# Patient Record
Sex: MALE | Race: WHITE | NOT HISPANIC OR LATINO | Employment: OTHER | URBAN - METROPOLITAN AREA
[De-identification: names, ages, dates, MRNs, and addresses within clinical notes are randomized per-mention and may not be internally consistent; named-entity substitution may affect disease eponyms.]

---

## 2024-02-14 ENCOUNTER — TRANSFERRED RECORDS (OUTPATIENT)
Dept: HEALTH INFORMATION MANAGEMENT | Facility: CLINIC | Age: 64
End: 2024-02-14

## 2024-06-20 ENCOUNTER — TRANSCRIBE ORDERS (OUTPATIENT)
Dept: OTHER | Age: 64
End: 2024-06-20

## 2024-06-20 DIAGNOSIS — Z95.1 S/P CABG (CORONARY ARTERY BYPASS GRAFT): Primary | ICD-10-CM

## 2024-06-24 ENCOUNTER — TRANSCRIBE ORDERS (OUTPATIENT)
Dept: OTHER | Age: 64
End: 2024-06-24

## 2024-07-12 ENCOUNTER — HOSPITAL ENCOUNTER (OUTPATIENT)
Dept: CARDIAC REHAB | Facility: HOSPITAL | Age: 64
Discharge: HOME | End: 2024-07-12
Attending: NURSE PRACTITIONER
Payer: COMMERCIAL

## 2024-07-12 DIAGNOSIS — Z95.1 S/P CABG (CORONARY ARTERY BYPASS GRAFT): ICD-10-CM

## 2024-07-12 PROCEDURE — 93798 PHYS/QHP OP CAR RHAB W/ECG: CPT

## 2024-07-12 PROCEDURE — 93797 PHYS/QHP OP CAR RHAB WO ECG: CPT

## 2024-07-17 ENCOUNTER — HOSPITAL ENCOUNTER (OUTPATIENT)
Dept: CARDIAC REHAB | Facility: HOSPITAL | Age: 64
Discharge: HOME | End: 2024-07-17
Attending: NURSE PRACTITIONER
Payer: COMMERCIAL

## 2024-07-17 PROCEDURE — 93798 PHYS/QHP OP CAR RHAB W/ECG: CPT

## 2024-07-22 ENCOUNTER — HOSPITAL ENCOUNTER (OUTPATIENT)
Dept: CARDIAC REHAB | Facility: HOSPITAL | Age: 64
Discharge: HOME | End: 2024-07-22
Attending: NURSE PRACTITIONER
Payer: COMMERCIAL

## 2024-07-22 PROCEDURE — 93798 PHYS/QHP OP CAR RHAB W/ECG: CPT

## 2024-07-24 ENCOUNTER — HOSPITAL ENCOUNTER (OUTPATIENT)
Dept: CARDIAC REHAB | Facility: HOSPITAL | Age: 64
Discharge: HOME | End: 2024-07-24
Attending: NURSE PRACTITIONER
Payer: COMMERCIAL

## 2024-07-24 PROCEDURE — 93798 PHYS/QHP OP CAR RHAB W/ECG: CPT

## 2024-07-29 ENCOUNTER — HOSPITAL ENCOUNTER (OUTPATIENT)
Dept: CARDIAC REHAB | Facility: HOSPITAL | Age: 64
Discharge: HOME | End: 2024-07-29
Attending: NURSE PRACTITIONER
Payer: COMMERCIAL

## 2024-07-29 PROCEDURE — 93798 PHYS/QHP OP CAR RHAB W/ECG: CPT

## 2024-07-31 ENCOUNTER — HOSPITAL ENCOUNTER (OUTPATIENT)
Dept: CARDIAC REHAB | Facility: HOSPITAL | Age: 64
Discharge: HOME | End: 2024-07-31
Attending: NURSE PRACTITIONER
Payer: COMMERCIAL

## 2024-07-31 PROCEDURE — 93798 PHYS/QHP OP CAR RHAB W/ECG: CPT

## 2024-08-05 ENCOUNTER — HOSPITAL ENCOUNTER (OUTPATIENT)
Dept: CARDIAC REHAB | Facility: HOSPITAL | Age: 64
Discharge: HOME | End: 2024-08-05
Attending: NURSE PRACTITIONER
Payer: MEDICARE

## 2024-08-05 PROCEDURE — 93798 PHYS/QHP OP CAR RHAB W/ECG: CPT

## 2024-08-07 ENCOUNTER — HOSPITAL ENCOUNTER (OUTPATIENT)
Dept: CARDIAC REHAB | Facility: HOSPITAL | Age: 64
Discharge: HOME | End: 2024-08-07
Attending: NURSE PRACTITIONER
Payer: MEDICARE

## 2024-08-07 PROCEDURE — 93798 PHYS/QHP OP CAR RHAB W/ECG: CPT

## 2024-08-11 ENCOUNTER — HEALTH MAINTENANCE LETTER (OUTPATIENT)
Age: 64
End: 2024-08-11

## 2024-08-12 ENCOUNTER — HOSPITAL ENCOUNTER (OUTPATIENT)
Dept: CARDIAC REHAB | Facility: HOSPITAL | Age: 64
Discharge: HOME | End: 2024-08-12
Attending: NURSE PRACTITIONER
Payer: MEDICARE

## 2024-08-12 PROCEDURE — 93798 PHYS/QHP OP CAR RHAB W/ECG: CPT

## 2024-08-14 ENCOUNTER — HOSPITAL ENCOUNTER (OUTPATIENT)
Dept: CARDIAC REHAB | Facility: HOSPITAL | Age: 64
Discharge: HOME | End: 2024-08-14
Attending: NURSE PRACTITIONER
Payer: MEDICARE

## 2024-08-14 PROCEDURE — 93798 PHYS/QHP OP CAR RHAB W/ECG: CPT

## 2024-08-19 ENCOUNTER — HOSPITAL ENCOUNTER (OUTPATIENT)
Dept: CARDIAC REHAB | Facility: HOSPITAL | Age: 64
Discharge: HOME | End: 2024-08-19
Attending: NURSE PRACTITIONER
Payer: MEDICARE

## 2024-08-19 PROCEDURE — 93798 PHYS/QHP OP CAR RHAB W/ECG: CPT

## 2024-08-21 ENCOUNTER — HOSPITAL ENCOUNTER (OUTPATIENT)
Dept: CARDIAC REHAB | Facility: HOSPITAL | Age: 64
Discharge: HOME | End: 2024-08-21
Attending: NURSE PRACTITIONER
Payer: MEDICARE

## 2024-08-21 PROCEDURE — 93798 PHYS/QHP OP CAR RHAB W/ECG: CPT

## 2024-08-26 ENCOUNTER — HOSPITAL ENCOUNTER (OUTPATIENT)
Dept: CARDIAC REHAB | Facility: HOSPITAL | Age: 64
Discharge: HOME | End: 2024-08-26
Attending: NURSE PRACTITIONER
Payer: MEDICARE

## 2024-08-26 LAB — GLUCOSE BLDC GLUCOMTR-MCNC: 117 MG/DL (ref 70–99)

## 2024-08-26 PROCEDURE — 93798 PHYS/QHP OP CAR RHAB W/ECG: CPT

## 2024-08-28 ENCOUNTER — HOSPITAL ENCOUNTER (OUTPATIENT)
Dept: CARDIAC REHAB | Facility: HOSPITAL | Age: 64
Discharge: HOME | End: 2024-08-28
Attending: NURSE PRACTITIONER
Payer: MEDICARE

## 2024-08-28 PROCEDURE — 93798 PHYS/QHP OP CAR RHAB W/ECG: CPT

## 2024-09-04 ENCOUNTER — HOSPITAL ENCOUNTER (OUTPATIENT)
Dept: CARDIAC REHAB | Facility: HOSPITAL | Age: 64
Discharge: HOME | End: 2024-09-04
Attending: NURSE PRACTITIONER
Payer: MEDICARE

## 2024-09-04 PROCEDURE — 93798 PHYS/QHP OP CAR RHAB W/ECG: CPT

## 2024-09-06 ENCOUNTER — HOSPITAL ENCOUNTER (OUTPATIENT)
Dept: CARDIAC REHAB | Facility: HOSPITAL | Age: 64
Discharge: HOME | End: 2024-09-06
Attending: NURSE PRACTITIONER
Payer: MEDICARE

## 2024-09-06 PROCEDURE — 93798 PHYS/QHP OP CAR RHAB W/ECG: CPT

## 2024-09-09 ENCOUNTER — HOSPITAL ENCOUNTER (OUTPATIENT)
Dept: CARDIAC REHAB | Facility: HOSPITAL | Age: 64
Discharge: HOME | End: 2024-09-09
Attending: NURSE PRACTITIONER
Payer: MEDICARE

## 2024-09-09 PROCEDURE — 93798 PHYS/QHP OP CAR RHAB W/ECG: CPT

## 2024-09-11 ENCOUNTER — HOSPITAL ENCOUNTER (OUTPATIENT)
Dept: CARDIAC REHAB | Facility: HOSPITAL | Age: 64
Discharge: HOME | End: 2024-09-11
Attending: NURSE PRACTITIONER
Payer: MEDICARE

## 2024-09-11 PROCEDURE — 93798 PHYS/QHP OP CAR RHAB W/ECG: CPT

## 2024-10-02 ENCOUNTER — HOSPITAL ENCOUNTER (OUTPATIENT)
Dept: CARDIAC REHAB | Facility: HOSPITAL | Age: 64
Discharge: HOME | End: 2024-10-02
Attending: NURSE PRACTITIONER
Payer: MEDICARE

## 2024-10-02 PROCEDURE — 93798 PHYS/QHP OP CAR RHAB W/ECG: CPT

## 2024-10-07 ENCOUNTER — HOSPITAL ENCOUNTER (OUTPATIENT)
Dept: CARDIAC REHAB | Facility: HOSPITAL | Age: 64
Discharge: HOME | End: 2024-10-07
Attending: NURSE PRACTITIONER
Payer: MEDICARE

## 2024-10-07 PROCEDURE — 93798 PHYS/QHP OP CAR RHAB W/ECG: CPT

## 2024-10-09 ENCOUNTER — HOSPITAL ENCOUNTER (OUTPATIENT)
Dept: CARDIAC REHAB | Facility: HOSPITAL | Age: 64
Discharge: HOME | End: 2024-10-09
Attending: NURSE PRACTITIONER
Payer: MEDICARE

## 2024-10-09 PROCEDURE — 93798 PHYS/QHP OP CAR RHAB W/ECG: CPT

## 2024-10-14 ENCOUNTER — HOSPITAL ENCOUNTER (OUTPATIENT)
Dept: CARDIAC REHAB | Facility: HOSPITAL | Age: 64
Discharge: HOME | End: 2024-10-14
Attending: NURSE PRACTITIONER
Payer: MEDICARE

## 2024-10-14 PROCEDURE — 93798 PHYS/QHP OP CAR RHAB W/ECG: CPT

## 2024-10-16 ENCOUNTER — HOSPITAL ENCOUNTER (OUTPATIENT)
Dept: CARDIAC REHAB | Facility: HOSPITAL | Age: 64
Discharge: HOME | End: 2024-10-16
Attending: NURSE PRACTITIONER
Payer: MEDICARE

## 2024-10-16 PROCEDURE — 93798 PHYS/QHP OP CAR RHAB W/ECG: CPT

## 2024-10-20 ENCOUNTER — HEALTH MAINTENANCE LETTER (OUTPATIENT)
Age: 64
End: 2024-10-20

## 2024-10-21 ENCOUNTER — HOSPITAL ENCOUNTER (OUTPATIENT)
Dept: CARDIAC REHAB | Facility: HOSPITAL | Age: 64
Discharge: HOME | End: 2024-10-21
Attending: NURSE PRACTITIONER
Payer: MEDICARE

## 2024-10-21 PROCEDURE — 93798 PHYS/QHP OP CAR RHAB W/ECG: CPT

## 2024-10-23 ENCOUNTER — HOSPITAL ENCOUNTER (OUTPATIENT)
Dept: CARDIAC REHAB | Facility: HOSPITAL | Age: 64
Discharge: HOME | End: 2024-10-23
Attending: NURSE PRACTITIONER
Payer: MEDICARE

## 2024-10-23 PROCEDURE — 93798 PHYS/QHP OP CAR RHAB W/ECG: CPT

## 2024-10-28 ENCOUNTER — HOSPITAL ENCOUNTER (OUTPATIENT)
Dept: CARDIAC REHAB | Facility: HOSPITAL | Age: 64
Discharge: HOME | End: 2024-10-28
Attending: NURSE PRACTITIONER
Payer: MEDICARE

## 2024-10-28 PROCEDURE — 93798 PHYS/QHP OP CAR RHAB W/ECG: CPT

## 2024-10-30 ENCOUNTER — HOSPITAL ENCOUNTER (OUTPATIENT)
Dept: CARDIAC REHAB | Facility: HOSPITAL | Age: 64
Discharge: HOME | End: 2024-10-30
Attending: NURSE PRACTITIONER
Payer: MEDICARE

## 2024-10-30 PROCEDURE — 93798 PHYS/QHP OP CAR RHAB W/ECG: CPT

## 2024-11-04 ENCOUNTER — HOSPITAL ENCOUNTER (OUTPATIENT)
Dept: CARDIAC REHAB | Facility: HOSPITAL | Age: 64
Discharge: HOME | End: 2024-11-04
Attending: INTERNAL MEDICINE
Payer: MEDICARE

## 2024-11-04 PROCEDURE — 93798 PHYS/QHP OP CAR RHAB W/ECG: CPT

## 2024-11-06 ENCOUNTER — HOSPITAL ENCOUNTER (OUTPATIENT)
Dept: CARDIAC REHAB | Facility: HOSPITAL | Age: 64
Discharge: HOME | End: 2024-11-06
Attending: INTERNAL MEDICINE
Payer: MEDICARE

## 2024-11-06 PROCEDURE — 93798 PHYS/QHP OP CAR RHAB W/ECG: CPT

## 2024-11-06 PROCEDURE — 93797 PHYS/QHP OP CAR RHAB WO ECG: CPT

## 2025-02-07 ENCOUNTER — TRANSFERRED RECORDS (OUTPATIENT)
Dept: HEALTH INFORMATION MANAGEMENT | Facility: CLINIC | Age: 65
End: 2025-02-07
Payer: MEDICARE

## 2025-02-07 ENCOUNTER — REFERRAL (OUTPATIENT)
Dept: TRANSPLANT | Facility: CLINIC | Age: 65
End: 2025-02-07
Payer: MEDICARE

## 2025-02-10 PROBLEM — E78.00 PURE HYPERCHOLESTEROLEMIA: Status: ACTIVE | Noted: 2025-02-10

## 2025-02-10 PROBLEM — D62 POSTOPERATIVE ANEMIA DUE TO ACUTE BLOOD LOSS: Status: ACTIVE | Noted: 2024-06-13

## 2025-02-10 PROBLEM — Z99.2 END STAGE RENAL FAILURE ON DIALYSIS (H): Status: ACTIVE | Noted: 2024-02-28

## 2025-02-10 PROBLEM — I82.412 DVT OF DEEP FEMORAL VEIN, LEFT (H): Status: ACTIVE | Noted: 2024-02-24

## 2025-02-10 PROBLEM — D69.6 THROMBOCYTOPENIA: Status: ACTIVE | Noted: 2024-06-13

## 2025-02-10 PROBLEM — Z99.2 CHRONIC KIDNEY DISEASE WITH END STAGE RENAL DISEASE ON DIALYSIS DUE TO TYPE 2 DIABETES MELLITUS (H): Chronic | Status: ACTIVE | Noted: 2024-02-28

## 2025-02-10 PROBLEM — I34.0 MODERATE MITRAL REGURGITATION: Status: ACTIVE | Noted: 2024-02-14

## 2025-02-10 PROBLEM — I47.20 VENTRICULAR TACHYCARDIA, UNSPECIFIED (H): Status: ACTIVE | Noted: 2025-02-03

## 2025-02-10 PROBLEM — K21.9 GASTROESOPHAGEAL REFLUX DISEASE: Status: ACTIVE | Noted: 2024-02-28

## 2025-02-10 PROBLEM — Z95.1 S/P CABG (CORONARY ARTERY BYPASS GRAFT): Status: ACTIVE | Noted: 2024-06-11

## 2025-02-10 PROBLEM — N18.6 CHRONIC KIDNEY DISEASE WITH END STAGE RENAL DISEASE ON DIALYSIS DUE TO TYPE 2 DIABETES MELLITUS (H): Chronic | Status: ACTIVE | Noted: 2024-02-28

## 2025-02-10 PROBLEM — I50.30 HEART FAILURE WITH PRESERVED EJECTION FRACTION (H): Status: ACTIVE | Noted: 2024-02-14

## 2025-02-10 PROBLEM — I25.10 ASCVD (ARTERIOSCLEROTIC CARDIOVASCULAR DISEASE): Status: ACTIVE | Noted: 2024-06-11

## 2025-02-10 PROBLEM — E11.22 CHRONIC KIDNEY DISEASE WITH END STAGE RENAL DISEASE ON DIALYSIS DUE TO TYPE 2 DIABETES MELLITUS (H): Chronic | Status: ACTIVE | Noted: 2024-02-28

## 2025-02-10 PROBLEM — I65.22 INTERNAL CAROTID ARTERY STENOSIS, LEFT: Status: ACTIVE | Noted: 2024-11-11

## 2025-02-10 PROBLEM — Z99.2 DIALYSIS PATIENT: Status: ACTIVE | Noted: 2024-02-22

## 2025-02-10 PROBLEM — N18.6 END STAGE RENAL FAILURE ON DIALYSIS (H): Status: ACTIVE | Noted: 2024-02-28

## 2025-02-10 PROBLEM — I10 HTN (HYPERTENSION): Status: ACTIVE | Noted: 2025-02-10

## 2025-02-10 PROBLEM — I50.32 CHRONIC DIASTOLIC HEART FAILURE (H): Status: ACTIVE | Noted: 2024-02-28

## 2025-02-10 PROBLEM — J44.9 CHRONIC OBSTRUCTIVE PULMONARY DISEASE (H): Status: ACTIVE | Noted: 2024-02-28

## 2025-02-10 NOTE — TELEPHONE ENCOUNTER
SOT KIDNEY INTAKE   Intake completed with   February 10, 2025    CARE TEAM    PCP: Dr. Clarke Trejo   Referring Organization: [facility]  Referring Provider: Sanjay Jarrell  Referring Diagnosis: ESRD    Insurance:  Medicare ID: [id number]   R ID: [id number] Group: [id number]    SCREENING     GFR/Date:     Is patient diabetic?  (if not diabetic go to next section)  Is patient on insulin?   Is patient under the age of 65?   Was patient offered a pancreas transplant referral?     Previous transplants   Cancer history:   Cardiac history:   Biopsy   Oxygen use at rest:  with activity:     BMI:  (BMI> 40 - RNCC call only/ no PKE date)      Is patient in a group home/assisted living?   Does patient have a guardian?     WRAP UP  Referral intake process completed.  Patient is aware that after financial approval is received, medical records will be requested.   Patient confirmed for a callback from transplant coordinator on . (within 2 weeks)  Tentative evaluation date  slot  .    Confirmed coordinator will discuss evaluation process in more detail at the time of their call.   Patient is aware of the need to arrange age appropriate cancer screening, vaccinations, and dental care.  Reminded patient to complete questionnaire, complete medical records release, and review packet prior to evaluation visit .  Assessed patient for special needs (ie-wheelchair, assistance, guardian, and ):     Patient instructed to call [phone] with questions.     Patient gave verbal consent during intake call to obtain medical records and documents outside of [facility]:    Patient agrees to [employer]

## 2025-02-11 RX ORDER — ASPIRIN 81 MG/1
81 TABLET ORAL
COMMUNITY
Start: 2024-07-16

## 2025-02-11 RX ORDER — DOCUSATE SODIUM 100 MG/1
100 CAPSULE, LIQUID FILLED ORAL
COMMUNITY

## 2025-02-11 RX ORDER — APIXABAN 5 MG/1
TABLET, FILM COATED ORAL
COMMUNITY

## 2025-02-11 RX ORDER — CHLORHEXIDINE GLUCONATE ORAL RINSE 1.2 MG/ML
SOLUTION DENTAL
COMMUNITY
Start: 2024-05-07

## 2025-02-11 RX ORDER — ASCORBIC ACID, THIAMINE, RIBOFLAVIN, NIACINAMIDE, PYRIDOXINE, FOLIC ACID, COBALAMIN, BIOTIN, PANTOTHENIC ACID, ZINC 100; 1.5; 1.7; 20; 10; 1; 6; 300; 10; 5 MG/1; MG/1; MG/1; MG/1; MG/1; MG/1; UG/1; UG/1; MG/1; MG/1
TABLET, COATED ORAL
COMMUNITY

## 2025-02-11 RX ORDER — AMLODIPINE BESYLATE 10 MG/1
TABLET ORAL
COMMUNITY

## 2025-02-11 RX ORDER — ACETAMINOPHEN 500 MG
1000 TABLET ORAL
COMMUNITY
Start: 2024-07-16

## 2025-02-11 RX ORDER — GLIPIZIDE 2.5 MG/1
2.5 TABLET, EXTENDED RELEASE ORAL
COMMUNITY
Start: 2024-02-28

## 2025-02-11 RX ORDER — ATORVASTATIN CALCIUM 80 MG/1
1 TABLET, FILM COATED ORAL AT BEDTIME
COMMUNITY
Start: 2024-02-28

## 2025-02-11 RX ORDER — ACETAMINOPHEN 325 MG/1
TABLET ORAL
COMMUNITY

## 2025-02-11 RX ORDER — BISOPROLOL FUMARATE 5 MG/1
2.5 TABLET, FILM COATED ORAL
COMMUNITY
Start: 2024-10-11

## 2025-02-11 RX ORDER — BLOOD-GLUCOSE METER
EACH MISCELLANEOUS DAILY
COMMUNITY
Start: 2024-02-26

## 2025-02-13 NOTE — TELEPHONE ENCOUNTER
VIRGINIA Shaw with [facility]; PH# [phone] - Per Patient's request to stop intake/Referral here at [facility], patient is seeking transplant at [facility] due to insurance reasons.

## 2025-02-23 ENCOUNTER — HEALTH MAINTENANCE LETTER (OUTPATIENT)
Age: 65
End: 2025-02-23

## 2025-04-12 ENCOUNTER — HEALTH MAINTENANCE LETTER (OUTPATIENT)
Age: 65
End: 2025-04-12

## 2025-05-24 ENCOUNTER — HEALTH MAINTENANCE LETTER (OUTPATIENT)
Age: 65
End: 2025-05-24

## 2025-07-29 ENCOUNTER — TRANSFERRED RECORDS (OUTPATIENT)
Dept: HEALTH INFORMATION MANAGEMENT | Facility: CLINIC | Age: 65
End: 2025-07-29
Payer: MEDICARE

## 2025-09-04 ENCOUNTER — TELEPHONE (OUTPATIENT)
Dept: TRANSPLANT | Facility: CLINIC | Age: 65
End: 2025-09-04
Payer: MEDICARE